# Patient Record
Sex: FEMALE | Race: WHITE | Employment: FULL TIME | URBAN - METROPOLITAN AREA
[De-identification: names, ages, dates, MRNs, and addresses within clinical notes are randomized per-mention and may not be internally consistent; named-entity substitution may affect disease eponyms.]

---

## 2017-11-21 ENCOUNTER — HOSPITAL ENCOUNTER (OUTPATIENT)
Dept: MAMMOGRAPHY | Age: 42
Discharge: HOME | End: 2017-11-21
Attending: INTERNAL MEDICINE | Admitting: INTERNAL MEDICINE

## 2017-11-21 DIAGNOSIS — Z12.31 VISIT FOR SCREENING MAMMOGRAM: ICD-10-CM

## 2019-04-29 ENCOUNTER — OFFICE VISIT (OUTPATIENT)
Dept: ORTHOPEDIC SURGERY | Age: 44
End: 2019-04-29
Payer: COMMERCIAL

## 2019-04-29 VITALS — BODY MASS INDEX: 24.64 KG/M2 | WEIGHT: 157 LBS | RESPIRATION RATE: 15 BRPM | HEIGHT: 67 IN

## 2019-04-29 DIAGNOSIS — M25.571 RIGHT ANKLE PAIN, UNSPECIFIED CHRONICITY: Primary | ICD-10-CM

## 2019-04-29 PROCEDURE — 99203 OFFICE O/P NEW LOW 30 MIN: CPT | Performed by: PHYSICIAN ASSISTANT

## 2019-04-29 PROCEDURE — L4361 PNEUMA/VAC WALK BOOT PRE OTS: HCPCS | Performed by: PHYSICIAN ASSISTANT

## 2019-04-29 RX ORDER — METOPROLOL SUCCINATE 25 MG/1
TABLET, EXTENDED RELEASE ORAL
Refills: 3 | COMMUNITY
Start: 2019-04-23

## 2019-04-29 NOTE — PROGRESS NOTES
Subjective:      Wiley Luna is a 37 y.o. female who presents with right ankle pain. Onset of the symptoms was 2 weeks ago. Inciting event: not sure; she has been increasing her activites - including running, pilates, swimming and working out with PT. Current symptoms include: ability to bear weight, but with some pain, swelling and pain up to 3/10 unless she touches it - then it goes up to 7/10. . Aggravating factors: direct pressure and weight bearing. Symptoms have been well-controlled. Patient has had no prior ankle problems. Evaluation to date: none. Treatment to date: none. Patient's medications, allergies, past medical, surgical, social and family histories were reviewed and updated as appropriate. Past Surgical History:   Procedure Laterality Date    CHOLECYSTECTOMY      HERNIA REPAIR      History reviewed. No pertinent past medical history. Objective: The patient is alert and oriented X3. Appears stated age. Answers all questions appropriately. Resp 15   Ht 5' 7\" (1.702 m)   Wt 157 lb (71.2 kg)   BMI 24.59 kg/m²   Right ankle:   Lateral edema. Tenderness over distal fibula only. No tendon tenderness. Does have pain with motion of ankle. Skin is intact. Patient is neurovascularly intact. Brisk capillary refill. Left ankle:   no effusion, full range of motion, no tenderness. Skin is intact. Patient is neurovascularly intact. Brisk capillary refill. Imaging:  X-ray of 3 views of the right ankle(s): periosteal reaction about distal fibula      Assessment:      Right distal fibula stress reaction      Plan:      Natural history and expected course discussed. Questions answered. Rest, ice, compression, elevation (RICE) therapy. OTC analgesics as needed. Follow-up in 2 weeks. Ok to participate in activites with Boot on. No running. Procedures    Breg Tall Genisus Walking Boot     Patient was prescribed a Breg Tall Genisus Walking Boot.   The right ankle will require stabilization / immobilization from this semi-rigid / rigid orthosis to improve their function. The orthosis will assist in protecting the affected area, provide functional support and facilitate healing. Patient was instructed to progress ambulation weight bearing as tolerated in the device. The patient was educated and fit by a healthcare professional with expert knowledge and specialization in brace application while under the direct supervision of the physician. Verbal and written instructions for the use of and application of this item were provided. They were instructed to contact the office immediately should the brace result in increased pain, decreased sensation, increased swelling or worsening of the condition.

## 2019-04-30 ENCOUNTER — TELEPHONE (OUTPATIENT)
Dept: ORTHOPEDIC SURGERY | Age: 44
End: 2019-04-30

## 2019-04-30 NOTE — TELEPHONE ENCOUNTER
4/30/19  [facility]    -  NO PRECERT REQUIRED - PER MARIELA -  REF #[id number] -  NDS

## 2019-05-15 ENCOUNTER — OFFICE VISIT (OUTPATIENT)
Dept: ORTHOPEDIC SURGERY | Age: 44
End: 2019-05-15
Payer: COMMERCIAL

## 2019-05-15 VITALS — BODY MASS INDEX: 24.64 KG/M2 | WEIGHT: 156.97 LBS | HEIGHT: 67 IN

## 2019-05-15 DIAGNOSIS — M84.363A STRESS FRACTURE OF RIGHT FIBULA, INITIAL ENCOUNTER: ICD-10-CM

## 2019-05-15 DIAGNOSIS — M25.571 RIGHT ANKLE PAIN, UNSPECIFIED CHRONICITY: Primary | ICD-10-CM

## 2019-05-15 DIAGNOSIS — M21.41 PES PLANUS OF BOTH FEET: ICD-10-CM

## 2019-05-15 DIAGNOSIS — M21.42 PES PLANUS OF BOTH FEET: ICD-10-CM

## 2019-05-15 PROBLEM — M21.40 FLAT FOOT: Status: ACTIVE | Noted: 2019-05-15

## 2019-05-15 PROCEDURE — 99203 OFFICE O/P NEW LOW 30 MIN: CPT | Performed by: FAMILY MEDICINE

## 2019-05-15 RX ORDER — MELOXICAM 15 MG/1
15 TABLET ORAL DAILY
Qty: 30 TABLET | Refills: 3 | Status: SHIPPED | OUTPATIENT
Start: 2019-05-15

## 2019-05-15 NOTE — PROGRESS NOTES
Chief Complaint    Initial Consultation New Patient (Right ankle: Seen in [employer] 4/29/2019, no injury, most the pain and swelling on lateral aspect, had increased her running, pilates, and working out, and started having pain with touch and had some swelling, Distal Fibula Stress reaction, in [place],; since being in the boot swelling has decreased and less pain to the touch, )    Initial consultation lateral right ankle pain with difficulty exercising and running    History of Present Illness:  Wiley Luna is a 37 y.o. female is a very pleasant white female  at [employer] who is a patient of Dr. Katalina Guerrero who is being seen today and after is follow-up from 4/29/2019 for evaluation of progressively worsening right lateral ankle pain. She states that over the last several months she has been attempting to exercise when she began notice the insidious onset of pain about 4/15/2019 to the lateral aspect of her ankle. There is no recalled history of actual injury or new activity prior to becoming symptomatic her pain symptoms were becoming more progressive with weightbearing to the point where she was having a constant achy pain at 3-4-10 with more intense 8 out of 10 pain with impact. She was actually seen in after hours on 4/29/2019 and was found to have a slight periosteal reaction over the distal fibula consistent with stress injury. She was placed in a boot which has resulted in a partial improvement of her symptoms. She is not really complaining of significant pain in the boot at this point but she will have some discomfort at 3-4-10 with walking outside the boot. She does not typically wear orthotics although she is an overpronator. She has been inconsistent with anti-inflammatories and has no previous history of stress injury or osteoporosis. She is being seen today for orthopedic and sports consultation with review of her imaging.             Medical History    Patient's plantarflexion, dorsiflexion, inversion, eversion. Strength is 5/5 thorough out all planes. Ligamentous stability is grossly intact. Right Lower Extremity: Examination of the right lower extremity does not show any tenderness, deformity or injury. Range of motion is unremarkable. There is no gross instability. There are no rashes, ulcerations or lesions. Strength and tone are normal.  Left Lower Extremity: Examination of the left lower extremity does not show any tenderness, deformity or injury. Range of motion is unremarkable. There is no gross instability. There are no rashes, ulcerations or lesions. Strength and tone are normal.        Diagnostic Test Findings:     Right ankle AP lateral mortise films were obtained today do show progressive callus formation and periosteal reaction consistent with stress injury to the lateral malleolus. There is no syndesmotic mortise widening or obvious signs of degenerative changes. Assessment :  #1. Partially improved right lateral malleolar stress fracture with ankle pain and weakness. #2.  Pes planus with overpronation. Impression:    Encounter Diagnoses   Name Primary?  Right ankle pain, unspecified chronicity Yes    Stress fracture of right fibula, initial encounter     Pes planus of both feet        Office Procedures:    Orders Placed This Encounter   Procedures    XR ANKLE RIGHT (MIN 3 VIEWS)     Standing Status:   Future     Number of Occurrences:   1     Standing Expiration Date:   5/15/2020            Treatment Plan:  Treatment options were discussed with Blanca Rojas. We did review her plain films and exam findings today. She last did impact exercise about 2 weeks ago and seems to be doing much better in the boot. She does have a clear stress fracture to the distal fibula without displacement. I would recommend continuing with the boot for another 2-3 weeks. We did start her on meloxicam 15 mg daily.   She will eventually need orthotics. Icing and activity modification was discussed. She being exercise upper body. We'll see her back in 2-3 weeks for repeat 3 view ankle films in this evening get her some functional bracing and discontinue her boot. She will contact us in the interim with questions or concerns. This dictation was performed with a verbal recognition program (DRAGON) and it was checked for errors. It is possible that there are still dictated errors within this office note. If so, please bring any errors to my attention for an addendum. All efforts were made to ensure that this office note is accurate.

## 2019-05-29 ENCOUNTER — OFFICE VISIT (OUTPATIENT)
Dept: ORTHOPEDIC SURGERY | Age: 44
End: 2019-05-29
Payer: COMMERCIAL

## 2019-05-29 VITALS
HEIGHT: 67 IN | WEIGHT: 156.97 LBS | BODY MASS INDEX: 24.64 KG/M2 | SYSTOLIC BLOOD PRESSURE: 130 MMHG | DIASTOLIC BLOOD PRESSURE: 79 MMHG | HEART RATE: 87 BPM

## 2019-05-29 DIAGNOSIS — M21.42 PES PLANUS OF BOTH FEET: ICD-10-CM

## 2019-05-29 DIAGNOSIS — M84.363A STRESS FRACTURE OF RIGHT FIBULA, INITIAL ENCOUNTER: ICD-10-CM

## 2019-05-29 DIAGNOSIS — M25.571 RIGHT ANKLE PAIN, UNSPECIFIED CHRONICITY: Primary | ICD-10-CM

## 2019-05-29 DIAGNOSIS — M21.41 PES PLANUS OF BOTH FEET: ICD-10-CM

## 2019-05-29 PROCEDURE — L3040 FT ARCH SUPRT PREMOLD LONGIT: HCPCS | Performed by: FAMILY MEDICINE

## 2019-05-29 PROCEDURE — 99213 OFFICE O/P EST LOW 20 MIN: CPT | Performed by: FAMILY MEDICINE

## 2019-05-29 PROCEDURE — L1902 AFO ANKLE GAUNTLET PRE OTS: HCPCS | Performed by: FAMILY MEDICINE

## 2019-05-29 NOTE — PROGRESS NOTES
Chief Complaint    IAnkle Pain (CK RIGHT ANKLE)    Follow-up right lateral malleolar stress fracture    History of Present Illness:  Cristofer Caldera is a 37 y.o. female is a very pleasant white female  at [employer] who is a patient of Dr. Valentine Salomon who is being seen today and after is follow-up from 4/29/2019 for evaluation of progressively worsening right lateral ankle pain. She states that over the last several months she has been attempting to exercise when she began notice the insidious onset of pain about 4/15/2019 to the lateral aspect of her ankle. There is no recalled history of actual injury or new activity prior to becoming symptomatic her pain symptoms were becoming more progressive with weightbearing to the point where she was having a constant achy pain at 3-4-10 with more intense 8 out of 10 pain with impact. She was actually seen in after hours on 4/29/2019 and was found to have a slight periosteal reaction over the distal fibula consistent with stress injury. She was placed in a boot which has resulted in a partial improvement of her symptoms. She is not really complaining of significant pain in the boot at this point but she will have some discomfort at 3-4-10 with walking outside the boot. She does not typically wear orthotics although she is an overpronator. She has been inconsistent with anti-inflammatories and has no previous history of stress injury or osteoporosis. She is being seen today for orthopedic and sports consultation with review of her imaging. Cristofer Caldera was last seen in the office 5/15/2019  for   1. Right ankle pain, unspecified chronicity    2. Stress fracture of right fibula, initial encounter    3. Pes planus of both feet     Patient is now 6 weeks out from injury onset. Patient is 85% better. She states that she does not have any pain while in the boot, but still has some tenderness to touch when palpating distal fibula.  She also states that the sheets while sleeping can irritate her slightly. Patients symptoms have improved with boot. Patients treatment to date has included rest, ice, NSAID- meloxicam, and boot. She last ran and did impact activities on 4/15/2019. She feels much better in the boot over the last couple of weeks and does have a couple of days of school remaining. There are going on a family vacation to the beach in [place] in 2 weeks. Denies locking or catching. It is more of a soreness laterally involving the ankle although she does have some difficult tenderness as well. She does have stiffness and weakness as expected. Soreness only a 2-3 out of 10. I have reviewed and agree with the documentation of the HPI documented by my . I will make any changes if necessary. Enc Date: 5/29/2019  Time: 4:23 PM  Provider: Anthony Dee MD  '          Medical History    Patient's medications, allergies, past medical, surgical, social and family histories were reviewed and updated as appropriate. Review of Systems    Pertinent items are noted in HPI  Review of systems reviewed from Patient History Form dated on 5/15/2019 and available in the patient's chart under the Media tab. Vital Signs  There were no vitals filed for this visit. General Exam:     Constitutional: Patient is adequately groomed with no evidence of malnutrition  DTRs: Deep tendon reflexes are intact  Mental Status: The patient is oriented to time, place and person. The patient's mood and affect are appropriate. Lymphatic: The lymphatic examination bilaterally reveals all areas to be without enlargement or induration. Vascular: Examination reveals no swelling or calf tenderness. Peripheral pulses are palpable and 2+. Neurological: The patient has good coordination. There is no weakness or sensory deficit. Ankle Examination  Inspection:  There is no high-grade deformity with improved swelling laterally. Palpation:  She still has 6 out of 10 pain with palpation of the right fibular metaphysis. She does have some associated tenderness of the ATFL and CF ligaments. Rang of Motion:  25% ankle range of motion loss with Achilles tightness noted. Strength:  Weakness 4-5 with eversion and dorsiflexion. Special Tests:  Osseous tenderness improved laterally as noted above. Mild discomfort 1+ drawer testing. Negative talar tilt and Owusu's testing. Skin: There are no rashes, ulcerations or lesions. Distal motor sensory and vascular exam is intact. Gait: Mild altalgia outside the boot. Reflex symmetrically preserved. Additional Comments:       Additional Examinations:       Contralateral Exam: Examination of the left ankle reveals intact skin. There is no focal tenderness or swelling. The patient demonstrates full painless range of motion with regards to plantarflexion, dorsiflexion, inversion, eversion. Strength is 5/5 thorough out all planes. Ligamentous stability is grossly intact. Right Lower Extremity: Examination of the right lower extremity does not show any tenderness, deformity or injury. Range of motion is unremarkable. There is no gross instability. There are no rashes, ulcerations or lesions. Strength and tone are normal.  Left Lower Extremity: Examination of the left lower extremity does not show any tenderness, deformity or injury. Range of motion is unremarkable. There is no gross instability. There are no rashes, ulcerations or lesions. Strength and tone are normal.        Diagnostic Test Findings:     Right ankle AP lateral mortise films were obtained today do show progressive callus formation and periosteal reaction consistent with stress injury to the lateral malleolus. There is no syndesmotic mortise widening or obvious signs of degenerative changes. Assessment :  #1.   Partially improved right lateral malleolar stress fracture with improving ankle pain and weakness. #2.  Pes planus with overpronation. Impression:    Encounter Diagnoses   Name Primary?  Right ankle pain, unspecified chronicity Yes    Stress fracture of right fibula, initial encounter     Pes planus of both feet        Office Procedures:    Orders Placed This Encounter   Procedures    XR ANKLE RIGHT (MIN 3 VIEWS)     Standing Status:   Future     Number of Occurrences:   1     Standing Expiration Date:   5/29/2020            Treatment Plan:  Treatment options were discussed with Davon Leroy. We did review her plain films and exam findings today. She last did impact exercise about 6 weeks ago and seems to be doing much better in the boot. She does have a clear stress fracture to the distal fibula without displacement. With her improvement, I would recommend we start weaning her out of the boot after this week. She was given a lace up brace and off the shelf inserts. We will start her on some physical therapy next week and see her back in a couple weeks for repeat evaluation and films prior to her leaving for [place] for family vacation on 6/13/2019. Icing and activity modification continuation of her meloxicam was recommended. She will contact us in the interim with questions or concerns. Three-view ankle films next visit. This dictation was performed with a verbal recognition program (DRAGON) and it was checked for errors. It is possible that there are still dictated errors within this office note. If so, please bring any errors to my attention for an addendum. All efforts were made to ensure that this office note is accurate.

## 2019-06-12 ENCOUNTER — OFFICE VISIT (OUTPATIENT)
Dept: ORTHOPEDIC SURGERY | Age: 44
End: 2019-06-12
Payer: COMMERCIAL

## 2019-06-12 VITALS
WEIGHT: 156.97 LBS | DIASTOLIC BLOOD PRESSURE: 84 MMHG | SYSTOLIC BLOOD PRESSURE: 118 MMHG | HEART RATE: 99 BPM | BODY MASS INDEX: 24.64 KG/M2 | HEIGHT: 67 IN

## 2019-06-12 DIAGNOSIS — M84.363A STRESS FRACTURE OF RIGHT FIBULA, INITIAL ENCOUNTER: ICD-10-CM

## 2019-06-12 DIAGNOSIS — M21.42 PES PLANUS OF BOTH FEET: ICD-10-CM

## 2019-06-12 DIAGNOSIS — M21.41 PES PLANUS OF BOTH FEET: ICD-10-CM

## 2019-06-12 DIAGNOSIS — M25.571 RIGHT ANKLE PAIN, UNSPECIFIED CHRONICITY: Primary | ICD-10-CM

## 2019-06-12 PROCEDURE — 99213 OFFICE O/P EST LOW 20 MIN: CPT | Performed by: FAMILY MEDICINE

## 2019-06-12 NOTE — PROGRESS NOTES
Chief Complaint    IKnee Pain (CK RIGHT ANKLE STRESS FRACTURE- XRAY CHECK TODAY)    Follow-up right lateral malleolar stress fracture    History of Present Illness:  Jose Vallejo is a 37 y.o. female is a very pleasant white female  at [employer] who is a patient of Dr. Brenda Lutz who is being seen today and after is follow-up from 4/29/2019 for evaluation of progressively worsening right lateral ankle pain. She states that over the last several months she has been attempting to exercise when she began notice the insidious onset of pain about 4/15/2019 to the lateral aspect of her ankle. There is no recalled history of actual injury or new activity prior to becoming symptomatic her pain symptoms were becoming more progressive with weightbearing to the point where she was having a constant achy pain at 3-4-10 with more intense 8 out of 10 pain with impact. She was actually seen in after hours on 4/29/2019 and was found to have a slight periosteal reaction over the distal fibula consistent with stress injury. She was placed in a boot which has resulted in a partial improvement of her symptoms. She is not really complaining of significant pain in the boot at this point but she will have some discomfort at 3-4-10 with walking outside the boot. She does not typically wear orthotics although she is an overpronator. She has been inconsistent with anti-inflammatories and has no previous history of stress injury or osteoporosis. She is being seen today for orthopedic and sports consultation with review of her imaging. Jose Vallejo was last seen in the office 5/15/2019  for   1. Right ankle pain, unspecified chronicity    2. Stress fracture of right fibula, initial encounter    3. Pes planus of both feet     Patient is now 6 weeks out from injury onset. Patient is 85% better.  She states that she does not have any pain while in the boot, but still has some tenderness to touch when palpating distal fibula. She also states that the sheets while sleeping can irritate her slightly. Patients symptoms have improved with boot. Patients treatment to date has included rest, ice, NSAID- meloxicam, and boot. She last ran and did impact activities on 4/15/2019. She feels much better in the boot over the last couple of weeks and does have a couple of days of school remaining. There are going on a family vacation to the beach in [place] in 2 weeks. Denies locking or catching. It is more of a soreness laterally involving the ankle although she does have some difficult tenderness as well. She does have stiffness and weakness as expected. Soreness only a 2-3 out of 10. Scott Lawler was last seen in the office 5/29/2019  for   1. Right ankle pain, unspecified chronicity    2. Stress fracture of right fibula, initial encounter    3. Pes planus of both feet     Patient is now 8 weeks out from injury onset. Patient is 90% better. Patients symptoms have improved with rest and immobilization. She has started water therapy and plans to start land therapy today with a personal physical therapist. She ranks her pain a 1 out of 10 - an occasional achyness intermittently - she states this feels more like muscle soreness than bony related this is more in the extensor digitorum and to some degree mildly over the peroneals. . Patients treatment to date has included rest, ice, NSAID- meloxicam, physical therapy, boot, and ankle brace. Previous to her injury, she had just started some interval running and does a mixture of both cardio and resistance training. She is not typically a distance runner but does more interval running. She has started some proprioceptive retraining. She is very pleased with her progress and does leave for vacation tomorrow to [place]. I have reviewed and agree with the documentation of the HPI documented by my . I will make any changes if necessary.      Enc Date: 6/12/2019  Time: 8:53 AM  Provider: Angeli Frederick MD                  Medical History    Patient's medications, allergies, past medical, surgical, social and family histories were reviewed and updated as appropriate. Review of Systems    Pertinent items are noted in HPI  Review of systems reviewed from Patient History Form dated on 5/15/2019 and available in the patient's chart under the Media tab. Vital Signs  There were no vitals filed for this visit. General Exam:     Constitutional: Patient is adequately groomed with no evidence of malnutrition  DTRs: Deep tendon reflexes are intact  Mental Status: The patient is oriented to time, place and person. The patient's mood and affect are appropriate. Lymphatic: The lymphatic examination bilaterally reveals all areas to be without enlargement or induration. Vascular: Examination reveals no swelling or calf tenderness. Peripheral pulses are palpable and 2+. Neurological: The patient has good coordination. There is no weakness or sensory deficit. Ankle Examination  Inspection:  There is no high-grade deformity with improved swelling laterally. Palpation:  She still has 0-1 out of 10 pain with palpation of the right fibular metaphysis. She does have minimal tenderness of the ATFL and CF ligaments. Rang of Motion: 10-20 % ankle range of motion loss with Achilles tightness noted. Strength: Strength at 4-4+ out of 5 with eversion and dorsiflexion. Special Tests: No significant osseous tenderness improved laterally as noted above. No further discomfort 1+ drawer testing. Negative talar tilt and Owusu's testing. Skin: There are no rashes, ulcerations or lesions. Distal motor sensory and vascular exam is intact. Gait: Improved gait. She is able to heel and toe walk and jump without substantial discomfort laterally. Reflex symmetrically preserved.     Additional Comments:       Additional Examinations: Contralateral Exam: Examination of the left ankle reveals intact skin. There is no focal tenderness or swelling. The patient demonstrates full painless range of motion with regards to plantarflexion, dorsiflexion, inversion, eversion. Strength is 5/5 thorough out all planes. Ligamentous stability is grossly intact. Right Lower Extremity: Examination of the right lower extremity does not show any tenderness, deformity or injury. Range of motion is unremarkable. There is no gross instability. There are no rashes, ulcerations or lesions. Strength and tone are normal.  Left Lower Extremity: Examination of the left lower extremity does not show any tenderness, deformity or injury. Range of motion is unremarkable. There is no gross instability. There are no rashes, ulcerations or lesions. Strength and tone are normal.        Diagnostic Test Findings:     Right ankle AP lateral mortise films were obtained today do show progressive callus formation and periosteal reaction consistent with stress injury to the lateral malleolus. There is no syndesmotic mortise widening or obvious signs of degenerative changes. Assessment :  #1. Improving right lateral malleolar stress fracture with improving ankle pain and weakness. #2.  Pes planus with overpronation. Impression:    Encounter Diagnoses   Name Primary?  Right ankle pain, unspecified chronicity Yes    Stress fracture of right fibula, initial encounter     Pes planus of both feet        Office Procedures:    Orders Placed This Encounter   Procedures    XR ANKLE RIGHT (MIN 3 VIEWS)     Standing Status:   Future     Number of Occurrences:   1     Standing Expiration Date:   6/12/2020            Treatment Plan:  Treatment options were discussed with Laura Sanchez. We did review her updated plain films and exam findings today.   She last did impact exercise about 8 weeks ago and seems to be doing much better at this time and rates are

## 2023-03-10 ENCOUNTER — OFFICE VISIT (OUTPATIENT)
Dept: ORTHOPEDIC SURGERY | Age: 48
End: 2023-03-10

## 2023-03-10 VITALS — WEIGHT: 156 LBS | BODY MASS INDEX: 24.48 KG/M2 | HEIGHT: 67 IN

## 2023-03-10 DIAGNOSIS — M25.571 ACUTE RIGHT ANKLE PAIN: Primary | ICD-10-CM

## 2023-03-10 RX ORDER — NAPROXEN 500 MG/1
500 TABLET ORAL 2 TIMES DAILY WITH MEALS
Qty: 28 TABLET | Refills: 1 | Status: SHIPPED | OUTPATIENT
Start: 2023-03-10

## 2023-03-11 NOTE — PROGRESS NOTES
Kervin Shultz [address] [facility]  Office Visit    Chief Complaint    New Patient (Right ankle pain )      History of Present Illness:  Nav Haque is a 52 y.o. female who presents for acute onset right ankle pain after injury. Patient states she was at work today around 1:00 when she tripped over a cord causing her to invert her right ankle. She immediately felt pain and had difficulty with ambulation. She works as a . She presented to the school nurse who gave her ice and wrapped her ankle. She states this did provide some relief. However, the ankle has continued to swell and she had difficulty with ambulation. This prompted her to present to the after-hours clinic for x-ray. Patient localizes her pain to the lateral aspect ankle. She describes her pain as 4/10, dull, achy, worse with ambulation especially stairs. The patient denies any clicking, popping, or locking of the joint. The patient denies any numbness, paresthesias, or weakness. Pain Assessment  Location of Pain: Ankle  Location Modifiers: Right  Severity of Pain: 4  Quality of Pain: Aching  Duration of Pain: Persistent  Frequency of Pain: Constant  Date Pain First Started: 03/10/23  Aggravating Factors: Walking, Stairs  Limiting Behavior: Yes  Relieving Factors: Rest  Result of Injury: Yes (fell over cord and tripped)  Work-Related Injury: No  Are there other pain locations you wish to document?: No    No past medical history on file. Past Surgical History:   Procedure Laterality Date    CHOLECYSTECTOMY      HERNIA REPAIR         No family history on file.     Social History     Socioeconomic History    Marital status:      Spouse name: None    Number of children: None    Years of education: None    Highest education level: None   Tobacco Use    Smoking status: Never    Smokeless tobacco: Never   Substance and Sexual Activity    Alcohol use: Yes       Current Outpatient Medications Medication Sig Dispense Refill    naproxen (NAPROSYN) 500 MG tablet Take 1 tablet by mouth 2 times daily (with meals) 28 tablet 1    meloxicam (MOBIC) 15 MG tablet Take 1 tablet by mouth daily 30 tablet 3    metoprolol succinate (TOPROL XL) 25 MG extended release tablet TK 1 T PO D  3    norethindrone-ethinyl estradiol-iron (MICROGESTIN FE1.5/30) 1.5-30 MG-MCG tablet Take 1 tablet by mouth daily. No current facility-administered medications for this visit. Allergies   Allergen Reactions    Vicodin [Hydrocodone-Acetaminophen]          Review of Systems:  A 14 point review of systems available in the scanned medical record as documented by the patient. Today's review pertinent items are noted in HPI. Vital Signs:   Ht 5' 7\" (1.702 m)   Wt 156 lb (70.8 kg)   BMI 24.43 kg/m²     General Exam:    Neuro: Alert & Oriented x 3,  normal,  no focal deficits noted. Normal mood & affect. Eyes: Sclera clear  Ears: Normal external ear  Mouth:  No perioral lesions  Pulm: Respirations unlabored and regular   Skin: Warm, well perfused      Ankle exam:   Inspection: No evidence of erythema or cellulitis around the ankle. No evidence of bruising. Mild swelling at the distal fibula    Range of motion: -10 to 45 degrees of plantarflexion. 45 degrees of inversion associated with no pain, 25 degrees of eversion. Resisted strength testin out of 5 strength in dorsiflexion, plantarflexion, inversion, and eversion. Palpation: Tender along the anterolateral joint line nonanterolateral ligamentous complex. Nontender around the Achilles nor deltoid ligament. tender to palpation of the ATFL and distal fibula    Stability test: Normal anterior drawer with solid endpoint. Neurovascular exam: Normal neuro vascular exam of the ankle and foot. Radiology:     3 View X-rays (AP lateral and mortise) of the right ankle were obtained and reviewed in office. Impression: No acute abnormalities.   No fractures or dislocations. Joint spaces are well-maintained. Assessment: Patient is a 52 y.o. female with lateral sided ankle pain after injury which occurred at work today. X-rays are negative for acute findings including fractures or dislocations. Clinical exam is consistent with diagnosis of inversion ankle sprain    Impression:      Office Procedures:  Orders Placed This Encounter   Medications    naproxen (NAPROSYN) 500 MG tablet     Sig: Take 1 tablet by mouth 2 times daily (with meals)     Dispense:  28 tablet     Refill:  1     Orders Placed This Encounter   Procedures    XR ANKLE RIGHT (MIN 3 VIEWS)     Standing Status:   Future     Number of Occurrences:   1     Standing Expiration Date:   3/10/2024    Breg Tall Deneen Walking Boot     Patient was prescribed a Breg Tall Deneen Walking Boot. The right ankle will require stabilization / immobilization from this semi-rigid / rigid orthosis to improve their function. The orthosis will assist in protecting the affected area, provide functional support and facilitate healing. Patient was instructed to progress ambulation weight bearing as tolerated in the device. The patient was educated and fit by a healthcare professional with expert knowledge and specialization in brace application while under the direct supervision of the physician. Verbal and written instructions for the use of and application of this item were provided. They were instructed to contact the office immediately should the brace result in increased pain, decreased sensation, increased swelling or worsening of the condition. Plan:  Pertinent imaging was reviewed. The etiology, natural history, and treatment options for the disorder were discussed. We believe patient is a candidate for Conservative treatment, including the following:   Rx for naproxen discussed and sent to patient's preferred pharmacy via [email or web address]. She will take this with food twice daily.   I did caution her about increased bleeding risk and GI upset. She was provided with a boot in the after-hours clinic. She will use this at all times of ambulation. She may remove the boot while sleeping and for hygiene. I stressed the importance of using ice and compression for continued swelling and symptom relief  I recommend that she participate in formalized physical therapy. She states she sees a physical therapist regularly at the [employer] [facility]. She would like to continue seeing him for her ankle sprain. I advised her to refrain from any running or jumping activities. She can use the Pilates reformer as long as its not putting stress on her ankle. She will follow-up in 2 weeks with  for repeat clinical exam      All the patient's questions were answered while in the clinic. The patient is understanding of all instructions and agrees with the plan. Approximately 30 minutes was spent on patient education and coordinating care. Follow up in: No follow-ups on file. Sincerely,  Brian Capellan      03/10/23  7:24 PM         This dictation was performed with a verbal recognition program (DRAGON) and it was checked for errors. It is possible that there are still dictated errors within this office note. If so, please bring any errors to my attention for an addendum. All efforts were made to ensure that this office note is accurate.